# Patient Record
Sex: MALE | Race: WHITE | Employment: OTHER | URBAN - METROPOLITAN AREA
[De-identification: names, ages, dates, MRNs, and addresses within clinical notes are randomized per-mention and may not be internally consistent; named-entity substitution may affect disease eponyms.]

---

## 2019-04-24 ENCOUNTER — APPOINTMENT (OUTPATIENT)
Dept: GENERAL RADIOLOGY | Age: 69
DRG: 378 | End: 2019-04-24
Attending: EMERGENCY MEDICINE
Payer: MEDICARE

## 2019-04-24 ENCOUNTER — HOSPITAL ENCOUNTER (INPATIENT)
Age: 69
LOS: 2 days | Discharge: HOME | DRG: 378 | End: 2019-04-26
Attending: EMERGENCY MEDICINE | Admitting: INTERNAL MEDICINE
Payer: MEDICARE

## 2019-04-24 DIAGNOSIS — R55 SYNCOPE, UNSPECIFIED SYNCOPE TYPE: ICD-10-CM

## 2019-04-24 DIAGNOSIS — K92.2 GASTROINTESTINAL HEMORRHAGE, UNSPECIFIED GASTROINTESTINAL HEMORRHAGE TYPE: Primary | ICD-10-CM

## 2019-04-24 PROBLEM — E78.5 HYPERLIPIDEMIA: Chronic | Status: ACTIVE | Noted: 2019-04-24

## 2019-04-24 PROBLEM — E87.6 HYPOKALEMIA: Status: ACTIVE | Noted: 2019-04-24

## 2019-04-24 PROBLEM — E11.9 DIABETES MELLITUS TYPE 2, CONTROLLED (HCC): Chronic | Status: ACTIVE | Noted: 2019-04-24

## 2019-04-24 PROBLEM — D64.9 ANEMIA: Status: ACTIVE | Noted: 2019-04-24

## 2019-04-24 PROBLEM — N17.9 AKI (ACUTE KIDNEY INJURY) (HCC): Status: ACTIVE | Noted: 2019-04-24

## 2019-04-24 LAB
ABO + RH BLD: NORMAL
ALBUMIN SERPL-MCNC: 3 G/DL (ref 3.2–4.6)
ALBUMIN/GLOB SERPL: 1.1 {RATIO} (ref 1.2–3.5)
ALP SERPL-CCNC: 111 U/L (ref 50–136)
ALT SERPL-CCNC: 29 U/L (ref 12–65)
ANION GAP SERPL CALC-SCNC: 9 MMOL/L (ref 7–16)
AST SERPL-CCNC: 15 U/L (ref 15–37)
ATRIAL RATE: 74 BPM
BASOPHILS # BLD: 0.1 K/UL (ref 0–0.2)
BASOPHILS NFR BLD: 1 % (ref 0–2)
BILIRUB SERPL-MCNC: 0.6 MG/DL (ref 0.2–1.1)
BLOOD GROUP ANTIBODIES SERPL: NORMAL
BUN SERPL-MCNC: 25 MG/DL (ref 8–23)
CALCIUM SERPL-MCNC: 7.8 MG/DL (ref 8.3–10.4)
CALCULATED P AXIS, ECG09: 61 DEGREES
CALCULATED R AXIS, ECG10: 76 DEGREES
CALCULATED T AXIS, ECG11: 50 DEGREES
CHLORIDE SERPL-SCNC: 108 MMOL/L (ref 98–107)
CO2 SERPL-SCNC: 22 MMOL/L (ref 21–32)
CREAT SERPL-MCNC: 1.63 MG/DL (ref 0.8–1.5)
DIAGNOSIS, 93000: NORMAL
DIFFERENTIAL METHOD BLD: ABNORMAL
EOSINOPHIL # BLD: 0.1 K/UL (ref 0–0.8)
EOSINOPHIL NFR BLD: 1 % (ref 0.5–7.8)
ERYTHROCYTE [DISTWIDTH] IN BLOOD BY AUTOMATED COUNT: 12.8 % (ref 11.9–14.6)
GLOBULIN SER CALC-MCNC: 2.7 G/DL (ref 2.3–3.5)
GLUCOSE BLD STRIP.AUTO-MCNC: 236 MG/DL (ref 65–100)
GLUCOSE SERPL-MCNC: 216 MG/DL (ref 65–100)
HCT VFR BLD AUTO: 35.1 % (ref 41.1–50.3)
HCT VFR BLD AUTO: 36.6 % (ref 41.1–50.3)
HGB BLD-MCNC: 11.6 G/DL (ref 13.6–17.2)
HGB BLD-MCNC: 11.6 G/DL (ref 13.6–17.2)
IMM GRANULOCYTES # BLD AUTO: 0.1 K/UL (ref 0–0.5)
IMM GRANULOCYTES NFR BLD AUTO: 1 % (ref 0–5)
LACTATE BLD-SCNC: 2.93 MMOL/L (ref 0.5–1.9)
LACTATE SERPL-SCNC: 1.6 MMOL/L (ref 0.4–2)
LYMPHOCYTES # BLD: 3.3 K/UL (ref 0.5–4.6)
LYMPHOCYTES NFR BLD: 24 % (ref 13–44)
MAGNESIUM SERPL-MCNC: 1.7 MG/DL (ref 1.8–2.4)
MCH RBC QN AUTO: 29.5 PG (ref 26.1–32.9)
MCHC RBC AUTO-ENTMCNC: 33 G/DL (ref 31.4–35)
MCV RBC AUTO: 89.3 FL (ref 79.6–97.8)
MONOCYTES # BLD: 1.7 K/UL (ref 0.1–1.3)
MONOCYTES NFR BLD: 12 % (ref 4–12)
NEUTS SEG # BLD: 8.5 K/UL (ref 1.7–8.2)
NEUTS SEG NFR BLD: 62 % (ref 43–78)
NRBC # BLD: 0 K/UL (ref 0–0.2)
P-R INTERVAL, ECG05: 182 MS
PLATELET # BLD AUTO: 155 K/UL (ref 150–450)
PMV BLD AUTO: 11.9 FL (ref 9.4–12.3)
POTASSIUM SERPL-SCNC: 3.4 MMOL/L (ref 3.5–5.1)
PROT SERPL-MCNC: 5.7 G/DL (ref 6.3–8.2)
Q-T INTERVAL, ECG07: 370 MS
QRS DURATION, ECG06: 90 MS
QTC CALCULATION (BEZET), ECG08: 410 MS
RBC # BLD AUTO: 3.93 M/UL (ref 4.23–5.6)
SODIUM SERPL-SCNC: 139 MMOL/L (ref 136–145)
SPECIMEN EXP DATE BLD: NORMAL
VENTRICULAR RATE, ECG03: 74 BPM
WBC # BLD AUTO: 13.8 K/UL (ref 4.3–11.1)

## 2019-04-24 PROCEDURE — 93005 ELECTROCARDIOGRAM TRACING: CPT | Performed by: EMERGENCY MEDICINE

## 2019-04-24 PROCEDURE — 83735 ASSAY OF MAGNESIUM: CPT

## 2019-04-24 PROCEDURE — 99285 EMERGENCY DEPT VISIT HI MDM: CPT | Performed by: EMERGENCY MEDICINE

## 2019-04-24 PROCEDURE — 96375 TX/PRO/DX INJ NEW DRUG ADDON: CPT | Performed by: EMERGENCY MEDICINE

## 2019-04-24 PROCEDURE — 65660000000 HC RM CCU STEPDOWN

## 2019-04-24 PROCEDURE — 80053 COMPREHEN METABOLIC PANEL: CPT

## 2019-04-24 PROCEDURE — 74011250637 HC RX REV CODE- 250/637: Performed by: INTERNAL MEDICINE

## 2019-04-24 PROCEDURE — 74011250636 HC RX REV CODE- 250/636: Performed by: INTERNAL MEDICINE

## 2019-04-24 PROCEDURE — 74011636637 HC RX REV CODE- 636/637: Performed by: INTERNAL MEDICINE

## 2019-04-24 PROCEDURE — 85018 HEMOGLOBIN: CPT

## 2019-04-24 PROCEDURE — 81003 URINALYSIS AUTO W/O SCOPE: CPT

## 2019-04-24 PROCEDURE — 82962 GLUCOSE BLOOD TEST: CPT

## 2019-04-24 PROCEDURE — 85025 COMPLETE CBC W/AUTO DIFF WBC: CPT

## 2019-04-24 PROCEDURE — 83605 ASSAY OF LACTIC ACID: CPT

## 2019-04-24 PROCEDURE — C9113 INJ PANTOPRAZOLE SODIUM, VIA: HCPCS | Performed by: EMERGENCY MEDICINE

## 2019-04-24 PROCEDURE — 74011250636 HC RX REV CODE- 250/636: Performed by: EMERGENCY MEDICINE

## 2019-04-24 PROCEDURE — 86900 BLOOD TYPING SEROLOGIC ABO: CPT

## 2019-04-24 PROCEDURE — 36415 COLL VENOUS BLD VENIPUNCTURE: CPT

## 2019-04-24 PROCEDURE — 96374 THER/PROPH/DIAG INJ IV PUSH: CPT | Performed by: EMERGENCY MEDICINE

## 2019-04-24 PROCEDURE — 74011000250 HC RX REV CODE- 250: Performed by: EMERGENCY MEDICINE

## 2019-04-24 PROCEDURE — 77030020263 HC SOL INJ SOD CL0.9% LFCR 1000ML

## 2019-04-24 PROCEDURE — 71045 X-RAY EXAM CHEST 1 VIEW: CPT

## 2019-04-24 RX ORDER — PRAVASTATIN SODIUM 20 MG/1
20 TABLET ORAL DAILY
COMMUNITY
End: 2019-04-26

## 2019-04-24 RX ORDER — METFORMIN HYDROCHLORIDE 500 MG/1
500 TABLET ORAL AS NEEDED
Status: ON HOLD | COMMUNITY
End: 2019-04-26 | Stop reason: SDUPTHER

## 2019-04-24 RX ORDER — ONDANSETRON 2 MG/ML
4 INJECTION INTRAMUSCULAR; INTRAVENOUS
Status: COMPLETED | OUTPATIENT
Start: 2019-04-24 | End: 2019-04-24

## 2019-04-24 RX ORDER — ASPIRIN 81 MG/1
81 TABLET ORAL DAILY
COMMUNITY
End: 2019-04-26

## 2019-04-24 RX ORDER — POLYETHYLENE GLYCOL 3350 17 G/17G
238 POWDER, FOR SOLUTION ORAL ONCE
Status: COMPLETED | OUTPATIENT
Start: 2019-04-24 | End: 2019-04-24

## 2019-04-24 RX ORDER — INSULIN LISPRO 100 [IU]/ML
INJECTION, SOLUTION INTRAVENOUS; SUBCUTANEOUS EVERY 6 HOURS
Status: DISCONTINUED | OUTPATIENT
Start: 2019-04-24 | End: 2019-04-25

## 2019-04-24 RX ORDER — ACETAMINOPHEN 325 MG/1
650 TABLET ORAL
Status: DISCONTINUED | OUTPATIENT
Start: 2019-04-24 | End: 2019-04-26 | Stop reason: HOSPADM

## 2019-04-24 RX ORDER — LISINOPRIL 10 MG/1
10 TABLET ORAL DAILY
COMMUNITY
End: 2019-04-26

## 2019-04-24 RX ORDER — SODIUM CHLORIDE 0.9 % (FLUSH) 0.9 %
5-40 SYRINGE (ML) INJECTION EVERY 8 HOURS
Status: DISCONTINUED | OUTPATIENT
Start: 2019-04-24 | End: 2019-04-26 | Stop reason: HOSPADM

## 2019-04-24 RX ORDER — BISACODYL 5 MG
20 TABLET, DELAYED RELEASE (ENTERIC COATED) ORAL
Status: COMPLETED | OUTPATIENT
Start: 2019-04-24 | End: 2019-04-24

## 2019-04-24 RX ORDER — LEVOTHYROXINE SODIUM 150 UG/1
150 TABLET ORAL
COMMUNITY

## 2019-04-24 RX ORDER — POTASSIUM CHLORIDE 20 MEQ/1
20 TABLET, EXTENDED RELEASE ORAL
Status: COMPLETED | OUTPATIENT
Start: 2019-04-24 | End: 2019-04-24

## 2019-04-24 RX ORDER — SODIUM CHLORIDE 0.9 % (FLUSH) 0.9 %
5-40 SYRINGE (ML) INJECTION AS NEEDED
Status: DISCONTINUED | OUTPATIENT
Start: 2019-04-24 | End: 2019-04-26 | Stop reason: HOSPADM

## 2019-04-24 RX ORDER — SODIUM CHLORIDE 9 MG/ML
100 INJECTION, SOLUTION INTRAVENOUS CONTINUOUS
Status: DISCONTINUED | OUTPATIENT
Start: 2019-04-24 | End: 2019-04-25

## 2019-04-24 RX ORDER — ONDANSETRON 2 MG/ML
4 INJECTION INTRAMUSCULAR; INTRAVENOUS
Status: DISCONTINUED | OUTPATIENT
Start: 2019-04-24 | End: 2019-04-26 | Stop reason: HOSPADM

## 2019-04-24 RX ADMIN — Medication 10 ML: at 18:27

## 2019-04-24 RX ADMIN — SODIUM CHLORIDE 1000 ML: 900 INJECTION, SOLUTION INTRAVENOUS at 15:35

## 2019-04-24 RX ADMIN — ONDANSETRON 4 MG: 2 INJECTION INTRAMUSCULAR; INTRAVENOUS at 15:35

## 2019-04-24 RX ADMIN — POTASSIUM CHLORIDE 20 MEQ: 20 TABLET, EXTENDED RELEASE ORAL at 18:26

## 2019-04-24 RX ADMIN — POLYETHYLENE GLYCOL 3350 238 G: 17 POWDER, FOR SOLUTION ORAL at 20:49

## 2019-04-24 RX ADMIN — SODIUM CHLORIDE 100 ML/HR: 900 INJECTION, SOLUTION INTRAVENOUS at 18:00

## 2019-04-24 RX ADMIN — BISACODYL 20 MG: 5 TABLET, COATED ORAL at 18:45

## 2019-04-24 RX ADMIN — PANTOPRAZOLE SODIUM 40 MG: 40 INJECTION, POWDER, FOR SOLUTION INTRAVENOUS at 15:35

## 2019-04-24 RX ADMIN — Medication 10 ML: at 20:50

## 2019-04-24 NOTE — H&P
Hospitalist H&P Note Admit Date:  2019  3:05 PM  
Name:  Dorian Joel Age:  78 y.o. 
:  1950 MRN:  [id number] PCP:  Jorge Luis Orozco MD 
Treatment Team: Attending Provider: Rubi Tavarez MD; Primary Nurse: Joycelyn Cote RN 
 
HPI:  
 
CC:  Rectal bleeding Mr. Jimbo Magaña is a 79 yo male with PMH of controlled DM2, HLP, intentional weight loss done by intermittent fasting who us evaluated with several bloody BM occurring today. 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 No shiloh nausea , abd pain or fever. He has not eaten anything out of the ordinary. He takes asa 81 mg daily, no ETOH use. Last bleeding episode 2 hours ago. He was noted to have episode of passing out while in the bathroom per wife- not preceded by dizziness or chest pain though still feels somewhat unsteady. Denies prior renal issues-creatinine currently 1.63, HGB 11.6. Up to date with colonoscopy 2-3 years ago. 10 systems reviewed and negative except as noted in HPI. Past Medical History:  
Diagnosis Date  DM (diabetes mellitus) ([facility] [address]  GERD (gastroesophageal reflux disease)  Hyperlipemia  Hypertension History reviewed. No pertinent surgical history. No Known Allergies Social History Tobacco Use  Smoking status: Not on file  Smokeless tobacco: Never Used Substance Use Topics  Alcohol use: Never Frequency: Never  
  
family history. - DM2 There is no immunization history on file for this patient. PTA Medications: 
None Objective:  
 
Patient Vitals for the past 24 hrs: 
 Pulse Resp BP SpO2  
19 1601 76 23 112/69 99 % 19 1551 73 22 112/63 100 % 19 1530 71 10 100/64 100 % 19 1513 76 18 122/75 100 % Oxygen Therapy O2 Sat (%): 99 % (19 1601) Pulse via Oximetry: 76 beats per minute (19 1601) O2 Device: Nasal cannula (04/24/19 1513) O2 Flow Rate (L/min): 2 l/min (04/24/19 1513) No intake or output data in the 24 hours ending 04/24/19 1659 Physical Exam: 
General:    Alert. No distress Eyes:   Normal sclera. Extraocular movements intact. PERRLA 
ENT:  Normocephalic, atraumatic. Dry mucous membranes CV:   RRR. No m/r/g.  . No edema Lungs:  CTAB. No wheezing, rhonchi, or rales. Abdomen: Soft, nontender, nondistended. Decreased BS Extremities: Warm and dry. . 
Neurologic:  grossly intact. Skin:     No rashes or jaundice. Normal coloration Psych:  Normal mood and affect. I reviewed the labs, imaging, EKGs, telemetry, and other studies done this admission. EKG tracing personally reviewed as NSR Data Review:  
Recent Results (from the past 24 hour(s)) EKG, 12 LEAD, INITIAL Collection Time: 04/24/19  3:11 PM  
Result Value Ref Range Ventricular Rate 74 BPM  
 Atrial Rate 74 BPM  
 P-R Interval 182 ms QRS Duration 90 ms Q-T Interval 370 ms QTC Calculation (Bezet) 410 ms Calculated P Axis 61 degrees Calculated R Axis 76 degrees Calculated T Axis 50 degrees Diagnosis    
  !! AGE AND GENDER SPECIFIC ECG ANALYSIS !! Normal sinus rhythm Normal ECG When compared with ECG of 14-NOV-2007 15:15, No significant change was found TYPE & SCREEN Collection Time: 04/24/19  3:15 PM  
Result Value Ref Range Crossmatch Expiration 04/27/2019 ABO/Rh(D) O POSITIVE Antibody screen NEG   
CBC WITH AUTOMATED DIFF Collection Time: 04/24/19  3:17 PM  
Result Value Ref Range WBC 13.8 (H) 4.3 - 11.1 K/uL  
 RBC 3.93 (L) 4.23 - 5.6 M/uL  
 HGB 11.6 (L) 13.6 - 17.2 g/dL HCT 35.1 (L) 41.1 - 50.3 % MCV 89.3 79.6 - 97.8 FL  
 MCH 29.5 26.1 - 32.9 PG  
 MCHC 33.0 31.4 - 35.0 g/dL  
 RDW 12.8 11.9 - 14.6 % PLATELET 851 054 - 691 K/uL MPV 11.9 9.4 - 12.3 FL ABSOLUTE NRBC 0.00 0.0 - 0.2 K/uL  
 DF AUTOMATED NEUTROPHILS 62 43 - 78 % LYMPHOCYTES 24 13 - 44 % MONOCYTES 12 4.0 - 12.0 % EOSINOPHILS 1 0.5 - 7.8 % BASOPHILS 1 0.0 - 2.0 % IMMATURE GRANULOCYTES 1 0.0 - 5.0 %  
 ABS. NEUTROPHILS 8.5 (H) 1.7 - 8.2 K/UL  
 ABS. LYMPHOCYTES 3.3 0.5 - 4.6 K/UL  
 ABS. MONOCYTES 1.7 (H) 0.1 - 1.3 K/UL  
 ABS. EOSINOPHILS 0.1 0.0 - 0.8 K/UL  
 ABS. BASOPHILS 0.1 0.0 - 0.2 K/UL  
 ABS. IMM. GRANS. 0.1 0.0 - 0.5 K/UL METABOLIC PANEL, COMPREHENSIVE Collection Time: 04/24/19  3:17 PM  
Result Value Ref Range Sodium 139 136 - 145 mmol/L Potassium 3.4 (L) 3.5 - 5.1 mmol/L Chloride 108 (H) 98 - 107 mmol/L  
 CO2 22 21 - 32 mmol/L Anion gap 9 7 - 16 mmol/L Glucose 216 (H) 65 - 100 mg/dL BUN 25 (H) 8 - 23 MG/DL Creatinine 1.63 (H) 0.8 - 1.5 MG/DL  
 GFR est AA 54 (L) >60 ml/min/1.73m2 GFR est non-AA 45 (L) >60 ml/min/1.73m2 Calcium 7.8 (L) 8.3 - 10.4 MG/DL Bilirubin, total 0.6 0.2 - 1.1 MG/DL  
 ALT (SGPT) 29 12 - 65 U/L  
 AST (SGOT) 15 15 - 37 U/L Alk. phosphatase 111 50 - 136 U/L Protein, total 5.7 (L) 6.3 - 8.2 g/dL Albumin 3.0 (L) 3.2 - 4.6 g/dL Globulin 2.7 2.3 - 3.5 g/dL A-G Ratio 1.1 (L) 1.2 - 3.5 POC LACTIC ACID Collection Time: 04/24/19  3:18 PM  
Result Value Ref Range Lactic Acid (POC) 2.93 (H) 0.5 - 1.9 mmol/L All Micro Results None Other Studies: Xr Chest [facility] Result Date: 4/24/2019 1 View portable chest x-ray 4/24/2019 3:36 PM Indication: Weakness, nausea, syncope. Comparison: None available at this PACS system Findings: This portable upright AP chest at 1539 shows the cardiomediastinal contours to be within normal limits. Cardiac monitoring leads project over the thorax. The lungs are clear and normally inflated. There is normal pulmonary vascularity. There is no pleural abnormality. IMPRESSION: Negative chest, no acute findings. Assessment and Plan:  
 
Hospital Problems as of 4/24/2019 Never Reviewed Codes Class Noted - Resolved POA * (Principal) GI bleed ICD-10-CM: K92.2 ICD-9-CM: 578.9  4/24/2019 - Present Yes Syncope ICD-10-CM: R55 
ICD-9-CM: 780.2  4/24/2019 - Present Yes Hyperlipidemia (Chronic) ICD-10-CM: V43.7 ICD-9-CM: 272.4  4/24/2019 - Present Yes Diabetes mellitus type 2, controlled ([facility] (Chronic) ICD-10-CM: E11.9 ICD-9-CM: 250.00  4/24/2019 - Present Yes SC[facility]acute [facility]) ([facility] ICD-10-CM: N17.9 ICD-9-CM: 584.9  4/24/2019 - Present Yes Anemia ICD-10-CM: D64.9 ICD-9-CM: 285.9  4/24/2019 - Present Yes Hypokalemia ICD-10-CM: E87.6 ICD-9-CM: 276.8  4/24/2019 - Present Yes · Acute GI bleed with mild acute blood loss anemia: admit to remote tele, NPO until seen by GI, trend HGB every 8 hours, transfuse HGB <8.0, add IV protonix once daily, stop ASA · Leukocytosis: likely reactive, repeat CBC with UA  
· Lactic acidosis: due to GI bleed, add IVF and recheck lab trend · SCOTT: hydrate and repeat lab, check UA  
· Hypokalemia: supplement and recheck, check magnesium · DM2: SSI , hold metformin · Syncope : followup on tele, appears vasovagal episode, 
· HLP: resume statin upon discharge Discharge planning:  pending DVT ppx: SCD Code status:  Full Estimated LOS:  Greater than 2 midnights Risk:  high Care plan: wife Carline Fulton [phone] Signed: Kristi Miner MD

## 2019-04-24 NOTE — H&P (VIEW-ONLY)
[facility] Consult Note Referring Physician:  Dr Valentine Barnes Consult Date:  4/24/2019 Admit Date:  4/24/2019 Chief Complaint:  Hematochezia Subjective:  
 
History of Present Illness:  Patient is a 78 y.o. WM with DM (pt of Dr Delmi Cooper) who is seen in consultation at the request of Dr. Valentine Barnes for hematochezia. The pt didn't sleep well last pm and felt a little queasy (thought maybe he was getting sick). He woke up this am fatigued but overall fine. He had a normal brown BM. Later in the day, he started having multiple loose, bloody BMs filling the commode (5-6 episodes) with urgency. He had a syncopal episode and wasn't fully responsive when his wife found him so EMS was called. He was initially hypotensive but BP responded to IVFs. He is feeling better this evening with no further bleeding or BMs but still fatigued. Hgb in ER was 11.6. Last colo was 2010 for which had sigmoid/descending diverticulosis and IH (rec'd repeat in 10yrs). Hasn't had food in 24hrs. PMH: 
Past Medical History:  
Diagnosis Date  DM (diabetes mellitus) ([address]  GERD (gastroesophageal reflux disease)  Hyperlipemia  Hypertension PSH: 
History reviewed. No pertinent surgical history. Allergies: 
No Known Allergies Home Medications: 
Prior to Admission medications Not on File Hospital Medications: 
Current Facility-Administered Medications Medication Dose Route Frequency  sodium chloride (NS) flush 5-40 mL  5-40 mL IntraVENous Q8H  
 sodium chloride (NS) flush 5-40 mL  5-40 mL IntraVENous PRN  
 acetaminophen (TYLENOL) tablet 650 mg  650 mg Oral Q6H PRN  
 ondansetron (ZOFRAN) injection 4 mg  4 mg IntraVENous Q4H PRN  
 insulin lispro (HUMALOG) injection   SubCUTAneous Q6H  
 0.9% sodium chloride infusion  100 mL/hr IntraVENous CONTINUOUS  
 [START ON 4/25/2019] pantoprazole (PROTONIX) 40 mg in sodium chloride 0.9% 10 mL injection  40 mg IntraVENous DAILY  bisacodyl (DULCOLAX) tablet 20 mg  20 mg Oral NOW  polyethylene glycol (MIRALAX) powder 238 g  238 g Oral ONCE Social History: 
Social History Tobacco Use  Smoking status: Not on file  Smokeless tobacco: Never Used Substance Use Topics  Alcohol use: Never Frequency: Never Family History: 
History reviewed. No pertinent family history. Review of Systems: A detailed 10 system ROS is obtained, with pertinent positives as listed above. All others are negative. Diet:  NPO Objective:  
 
Physical Exam: 
Vitals: 
Visit Vitals /73 (BP 1 Location: Left arm, BP Patient Position: At rest) Pulse 77 Temp 98.2 °F (36.8 °C) Resp 20 Ht 5' 8\" (1.727 m) Wt 81.6 kg (180 lb) SpO2 98% BMI 27.37 kg/m² Gen:  Pt is alert, cooperative, no acute distress Skin:  Extremities and face reveal no rashes. No jaundice HEENT: Sclerae anicteric. MMM. No abnormal pigmentation of the lips. The neck is supple. Cardiovascular: Regular rate and rhythm. No murmurs, gallops, or rubs. Respiratory:  Comfortable breathing with no accessory muscle use. Clear breath sounds anteriorly with no wheezes, rales, or rhonchi. GI:  Abdomen nondistended, soft, and nontender. Normal active bowel sounds. No enlargement of the liver or spleen. No masses palpable. Rectal:  Deferred Musculoskeletal:  No pitting edema of the lower legs. Neurological:  Gross memory appears intact. Patient is alert and oriented. Laboratory:   
Recent Labs  
  04/24/19 
1517 WBC 13.8* HGB 11.6* HCT 35.1*  
 MCV 89.3   
K 3.4*  
* CO2 22 BUN 25* CREA 1.63* CA 7.8*  
*  SGOT 15 ALT 29 TBILI 0.6 Assessment: 
  
 
Principal Problem: 
  GI bleed (4/24/2019) Active Problems: 
  Syncope (4/24/2019) Hyperlipidemia (4/24/2019) Diabetes mellitus type 2, controlled ([facility] [address] (4/24/2019) SCOTT (acute kidney injury) ([facility] [address] (4/24/2019) Anemia (4/24/2019) Hypokalemia (4/24/2019) Plan: 
  
 
Pleasant 66yo WM with DM and HLD presenting with 5-6 episodes of large volume painless hematochezia associated with mild blood loss anemia and hypotension (now improved), concerning for lower GI bleed. Given his presentation and hx, the most likely etiology is a diverticular bleed (though other etiologies such as neoplasm, ischemia, etc need to be considered- less likely given the presentation and lack of abdominal pain---- though he was hypotensive so ischemia still possible). - Start clears 
- NPO after midnight - Prep tonight for colo tomorrow - Trend H/H and transfuse as needed (likely will be lower at next check) - If he starts acutely bleeding tonight with VS instability, will need to go for STAT bleeding scan with possible angio/embolization per MARCELINA Nur MD

## 2019-04-24 NOTE — ED PROVIDER NOTES
Here with 3 bloody stools. The last one being quite large and symptomatically hypotensive after that. He returns close to baseline after being given a 700 mL bolus of saline during transport by EMS after thought pulseless by spouse with syncope. He does not have a history of GI bleeds in the past.  He is not on any medications of increased risk other and a baby aspirin per day. No history of bleeding ulcer. Denies any present abdominal pain. No other abnormal bleeding or bruising. Primary other health issues and is type 2 diabetes. Denies any chest pain or cardiac history of awareness. Noticed no bleeding otherwise. The history is provided by the patient. Rectal Bleeding This is a new problem. Pertinent negatives include no abdominal pain, no chills and no fever. He has tried nothing for the symptoms. His past medical history does not include irritable bowel syndrome, small bowel obstruction or abdominal surgery. Past Medical History:  
Diagnosis Date  DM (diabetes mellitus) ([facility] [address]  GERD (gastroesophageal reflux disease)  Hyperlipemia  Hypertension History reviewed. No pertinent surgical history. History reviewed. No pertinent family history. Social History Socioeconomic History  Marital status:  Spouse name: Not on file  Number of children: Not on file  Years of education: Not on file  Highest education level: Not on file Occupational History  Not on file Social Needs  Financial resource strain: Not on file  Food insecurity:  
  Worry: Not on file Inability: Not on file  Transportation needs:  
  Medical: Not on file Non-medical: Not on file Tobacco Use  Smoking status: Not on file  Smokeless tobacco: Never Used Substance and Sexual Activity  Alcohol use: Never Frequency: Never  Drug use: Never  Sexual activity: Not on file Lifestyle  Physical activity: Days per week: Not on file Minutes per session: Not on file  Stress: Not on file Relationships  Social connections:  
  Talks on phone: Not on file Gets together: Not on file Attends [employer] service: Not on file Active member of club or organization: Not on file Attends meetings of clubs or organizations: Not on file Relationship status: Not on file  Intimate partner violence:  
  Fear of current or ex partner: Not on file Emotionally abused: Not on file Physically abused: Not on file Forced sexual activity: Not on file Other Topics Concern  Not on file Social History Narrative  Not on file ALLERGIES: Patient has no known allergies. Review of Systems Constitutional: Negative for chills and fever. HENT: Negative for nosebleeds. Gastrointestinal: Positive for anal bleeding. Negative for abdominal pain. Genitourinary: Negative for hematuria. Hematological: Does not bruise/bleed easily. Psychiatric/Behavioral: Negative for confusion and decreased concentration. All other systems reviewed and are negative. Vitals:  
 04/24/19 1513 04/24/19 1530 04/24/19 1551 04/24/19 1601 BP: 122/75 100/64 112/63 112/69 Pulse: 76 71 73 76 Resp: 18 10 22 23 SpO2: 100% 100% 100% 99% Weight: 81.6 kg (180 lb) Height: 5' 8\" (1.727 m) Physical Exam  
Constitutional: He appears well-developed and well-nourished. No distress. Stabilizes in ER. Very alert and conversant HENT:  
Head: Atraumatic. Eyes: No scleral icterus. Neck: Neck supple. Cardiovascular: Normal rate. Pulmonary/Chest: Effort normal. No respiratory distress. Abdominal: Soft. There is no tenderness. There is no guarding. Musculoskeletal: He exhibits no edema or deformity. Neurological: He is alert. Skin: Skin is warm and dry. No bruising and no ecchymosis noted. Psychiatric: Thought content normal.  
Nursing note and vitals reviewed. MDM 
Number of Diagnoses or Management Options Diagnosis management comments: Symptomatic GIB at home - GI will see. May need blood if continues Amount and/or Complexity of Data Reviewed Clinical lab tests: reviewed and ordered Risk of Complications, Morbidity, and/or Mortality Presenting problems: moderate Diagnostic procedures: low Management options: moderate Patient Progress Patient progress: stable Procedures

## 2019-04-24 NOTE — CONSULTS
[facility] Consult Note    Referring Physician:  Dr Bernie John    Consult Date:  4/24/2019    Admit Date:  4/24/2019    Chief Complaint:  Hematochezia    Subjective:     History of Present Illness:  Patient is a 78 y.o. WM with DM (pt of Dr Arlene Cast) who is seen in consultation at the request of Dr. Bernie John for hematochezia. The pt didn't sleep well last pm and felt a little queasy (thought maybe he was getting sick). He woke up this am fatigued but overall fine. He had a normal brown BM. Later in the day, he started having multiple loose, bloody BMs filling the commode (5-6 episodes) with urgency. He had a syncopal episode and wasn't fully responsive when his wife found him so EMS was called. He was initially hypotensive but BP responded to IVFs. He is feeling better this evening with no further bleeding or BMs but still fatigued. Hgb in ER was 11.6. Last colo was 2010 for which had sigmoid/descending diverticulosis and IH (rec'd repeat in 10yrs). Hasn't had food in 24hrs. PMH:  Past Medical History:   Diagnosis Date    DM (diabetes mellitus) ([address]     GERD (gastroesophageal reflux disease)     Hyperlipemia     Hypertension        PSH:  History reviewed. No pertinent surgical history.     Allergies:  No Known Allergies    Home Medications:  Prior to Admission medications    Not on [facility] Medications:  Current Facility-Administered Medications   Medication Dose Route Frequency    sodium chloride (NS) flush 5-40 mL  5-40 mL IntraVENous Q8H    sodium chloride (NS) flush 5-40 mL  5-40 mL IntraVENous PRN    acetaminophen (TYLENOL) tablet 650 mg  650 mg Oral Q6H PRN    ondansetron (ZOFRAN) injection 4 mg  4 mg IntraVENous Q4H PRN    insulin lispro (HUMALOG) injection   SubCUTAneous Q6H    0.9% sodium chloride infusion  100 mL/hr IntraVENous CONTINUOUS    [START ON 4/25/2019] pantoprazole (PROTONIX) 40 mg in sodium chloride 0.9% 10 mL injection  40 mg IntraVENous DAILY    bisacodyl (DULCOLAX) tablet 20 mg  20 mg Oral NOW    polyethylene glycol (MIRALAX) powder 238 g  238 g Oral ONCE       Social History:  Social History     Tobacco Use    Smoking status: Not on file    Smokeless tobacco: Never Used   Substance Use Topics    Alcohol use: Never     Frequency: Never         Family History:  History reviewed. No pertinent family history. Review of Systems:  A detailed 10 system ROS is obtained, with pertinent positives as listed above. All others are negative. Diet:  NPO    Objective:     Physical Exam:  Vitals:  Visit Vitals  /73 (BP 1 Location: Left arm, BP Patient Position: At rest)   Pulse 77   Temp 98.2 °F (36.8 °C)   Resp 20   Ht 5' 8\" (1.727 m)   Wt 81.6 kg (180 lb)   SpO2 98%   BMI 27.37 kg/m²     Gen:  Pt is alert, cooperative, no acute distress  Skin:  Extremities and face reveal no rashes. No jaundice  HEENT: Sclerae anicteric. MMM. No abnormal pigmentation of the lips. The neck is supple. Cardiovascular: Regular rate and rhythm. No murmurs, gallops, or rubs. Respiratory:  Comfortable breathing with no accessory muscle use. Clear breath sounds anteriorly with no wheezes, rales, or rhonchi. GI:  Abdomen nondistended, soft, and nontender. Normal active bowel sounds. No enlargement of the liver or spleen. No masses palpable. Rectal:  Deferred  Musculoskeletal:  No pitting edema of the lower legs. Neurological:  Gross memory appears intact. Patient is alert and oriented.       Laboratory:    Recent Labs     04/24/19  1517   WBC 13.8*   HGB 11.6*   HCT 35.1*      MCV 89.3      K 3.4*   *   CO2 22   BUN 25*   CREA 1.63*   CA 7.8*   *      SGOT 15   ALT 29   TBILI 0.6       Assessment:       Principal Problem:    GI bleed (4/24/2019)    Active Problems:    Syncope (4/24/2019)      Hyperlipidemia (4/24/2019)      Diabetes mellitus type 2, controlled ([facility] [address] (4/24/2019)      SC[facility] (4/24/2019)      Anemia (4/24/2019)      Hypokalemia (4/24/2019)        Plan:       Pleasant 66yo WM with DM and HLD presenting with 5-6 episodes of large volume painless hematochezia associated with mild blood loss anemia and hypotension (now improved), concerning for lower GI bleed. Given his presentation and hx, the most likely etiology is a diverticular bleed (though other etiologies such as neoplasm, ischemia, etc need to be considered- less likely given the presentation and lack of abdominal pain---- though he was hypotensive so ischemia still possible).     - Start clears  - NPO after midnight  - Prep tonight for colo tomorrow  - Trend H/H and transfuse as needed (likely will be lower at next check)  - If he starts acutely bleeding tonight with VS instability, will need to go for STAT bleeding scan with possible angio/embolization per IR P Mauri Cowden, MD

## 2019-04-24 NOTE — PROGRESS NOTES
END OF SHIFT NOTE: 
 
Intake/Output No intake/output data recorded. Voiding: YES Catheter: NO 
Drain:   
 
 
 
 
Stool:  0 occurrences. Stool Assessment Stool Color: Red (04/24/19 1517) Stool Appearance: Melena (04/24/19 1517) Stool Amount: Large (04/24/19 1517) Stool Source/Status: Rectum (04/24/19 1517) Emesis:  0 occurrences. VITAL SIGNS Patient Vitals for the past 12 hrs: 
 Temp Pulse Resp BP SpO2  
04/24/19 1800 98.2 °F (36.8 °C) 77 20 106/73 98 % 04/24/19 1730 98.5 °F (36.9 °C)      
04/24/19 1701  68 24 107/64 100 % 04/24/19 1601  76 23 112/69 99 % 04/24/19 1551  73 22 112/63 100 % 04/24/19 1530  71 10 100/64 100 % 04/24/19 1513  76 18 122/75 100 % Pain Assessment Pain 1 Pain Scale 1: Numeric (0 - 10) (04/24/19 1513) Pain Intensity 1: 2 (04/24/19 1513) Ambulating Yes Additional Information:  
 
Shift report given to oncoming nurse at the bedside. Leopold Nails

## 2019-04-24 NOTE — ED TRIAGE NOTES
Pt arrives per EMS from home after several rounds of bright red bloody stool. Pt found on toilet by EMS. Upon moving pt to stretcher pt went unresponsive and they lost radial pulses. . Given 600mls fluid en route. No hx of GI bleeds. On arrival pt pale, cool, on non rebreather and complaining of feeling weak and nauseated. Per family pt has been doing intermittent fasting to control his diabetes.

## 2019-04-24 NOTE — ED NOTES
TRANSFER - OUT REPORT: 
 
Verbal report given to JUVE Dow(name) on Mac Fermin  being transferred to 5th floor(unit) for routine progression of care Report consisted of patients Situation, Background, Assessment and  
Recommendations(SBAR). Information from the following report(s) ED Summary was reviewed with the receiving nurse. Lines:  
Peripheral IV 04/24/19 Left Antecubital (Active) Site Assessment Clean, dry, & intact 4/24/2019  3:10 PM  
Phlebitis Assessment 0 4/24/2019  3:10 PM  
Infiltration Assessment 0 4/24/2019  3:10 PM  
Dressing Status Clean, dry, & intact 4/24/2019  3:10 PM  
   
Peripheral IV 04/24/19 Right Antecubital (Active) Site Assessment Clean, dry, & intact 4/24/2019  3:10 PM  
Phlebitis Assessment 0 4/24/2019  3:10 PM  
Infiltration Assessment 0 4/24/2019  3:10 PM  
Dressing Status Clean, dry, & intact 4/24/2019  3:10 PM  
  
 
Opportunity for questions and clarification was provided. Patient transported with: 
 O2 @ 2 liters, pt chart, pt belongings, IV fluids, family member

## 2019-04-24 NOTE — PROGRESS NOTES
TRANSFER - IN REPORT: 
Verbal report received from [facility]) on Mari Sandoval  being received from ED(unit) for routine progression of care Report consisted of patients Situation, Background, Assessment and  
Recommendations(SBAR). Information from the following report(s) SBAR, Kardex, Intake/Output, MAR and Recent Results was reviewed with the receiving nurse. Opportunity for questions and clarification was provided. Assessment will be completed upon patients arrival to unit and care assumed.

## 2019-04-25 ENCOUNTER — ANESTHESIA EVENT (OUTPATIENT)
Dept: ENDOSCOPY | Age: 69
DRG: 378 | End: 2019-04-25
Payer: MEDICARE

## 2019-04-25 ENCOUNTER — ANESTHESIA (OUTPATIENT)
Dept: ENDOSCOPY | Age: 69
DRG: 378 | End: 2019-04-25
Payer: MEDICARE

## 2019-04-25 LAB
ANION GAP SERPL CALC-SCNC: 10 MMOL/L (ref 7–16)
APPEARANCE UR: CLEAR
BASOPHILS # BLD: 0 K/UL (ref 0–0.2)
BASOPHILS NFR BLD: 0 % (ref 0–2)
BILIRUB UR QL: NEGATIVE
BUN SERPL-MCNC: 18 MG/DL (ref 8–23)
CALCIUM SERPL-MCNC: 7.9 MG/DL (ref 8.3–10.4)
CHLORIDE SERPL-SCNC: 108 MMOL/L (ref 98–107)
CO2 SERPL-SCNC: 21 MMOL/L (ref 21–32)
COLOR UR: YELLOW
CREAT SERPL-MCNC: 1.06 MG/DL (ref 0.8–1.5)
DIFFERENTIAL METHOD BLD: ABNORMAL
EOSINOPHIL # BLD: 0 K/UL (ref 0–0.8)
EOSINOPHIL NFR BLD: 0 % (ref 0.5–7.8)
ERYTHROCYTE [DISTWIDTH] IN BLOOD BY AUTOMATED COUNT: 12.9 % (ref 11.9–14.6)
GLUCOSE BLD STRIP.AUTO-MCNC: 135 MG/DL (ref 65–100)
GLUCOSE BLD STRIP.AUTO-MCNC: 138 MG/DL (ref 65–100)
GLUCOSE BLD STRIP.AUTO-MCNC: 140 MG/DL (ref 65–100)
GLUCOSE BLD STRIP.AUTO-MCNC: 207 MG/DL (ref 65–100)
GLUCOSE BLD STRIP.AUTO-MCNC: 265 MG/DL (ref 65–100)
GLUCOSE SERPL-MCNC: 217 MG/DL (ref 65–100)
GLUCOSE UR STRIP.AUTO-MCNC: NEGATIVE MG/DL
HCT VFR BLD AUTO: 32.6 % (ref 41.1–50.3)
HCT VFR BLD AUTO: 34 % (ref 41.1–50.3)
HGB BLD-MCNC: 10.4 G/DL (ref 13.6–17.2)
HGB BLD-MCNC: 10.8 G/DL (ref 13.6–17.2)
HGB UR QL STRIP: NEGATIVE
IMM GRANULOCYTES # BLD AUTO: 0 K/UL (ref 0–0.5)
IMM GRANULOCYTES NFR BLD AUTO: 1 % (ref 0–5)
KETONES UR QL STRIP.AUTO: NEGATIVE MG/DL
LACTATE SERPL-SCNC: 1.3 MMOL/L (ref 0.4–2)
LEUKOCYTE ESTERASE UR QL STRIP.AUTO: NEGATIVE
LYMPHOCYTES # BLD: 1.1 K/UL (ref 0.5–4.6)
LYMPHOCYTES NFR BLD: 14 % (ref 13–44)
MCH RBC QN AUTO: 28.8 PG (ref 26.1–32.9)
MCHC RBC AUTO-ENTMCNC: 31.9 G/DL (ref 31.4–35)
MCV RBC AUTO: 90.3 FL (ref 79.6–97.8)
MONOCYTES # BLD: 0.7 K/UL (ref 0.1–1.3)
MONOCYTES NFR BLD: 8 % (ref 4–12)
NEUTS SEG # BLD: 6 K/UL (ref 1.7–8.2)
NEUTS SEG NFR BLD: 76 % (ref 43–78)
NITRITE UR QL STRIP.AUTO: NEGATIVE
NRBC # BLD: 0 K/UL (ref 0–0.2)
PH UR STRIP: 5.5 [PH] (ref 5–9)
PLATELET # BLD AUTO: 107 K/UL (ref 150–450)
PMV BLD AUTO: 11.4 FL (ref 9.4–12.3)
POTASSIUM SERPL-SCNC: 3.9 MMOL/L (ref 3.5–5.1)
PROT UR STRIP-MCNC: NEGATIVE MG/DL
RBC # BLD AUTO: 3.61 M/UL (ref 4.23–5.6)
SODIUM SERPL-SCNC: 139 MMOL/L (ref 136–145)
SP GR UR REFRACTOMETRY: 1.01 (ref 1–1.02)
UROBILINOGEN UR QL STRIP.AUTO: 0.2 EU/DL (ref 0.2–1)
WBC # BLD AUTO: 7.9 K/UL (ref 4.3–11.1)

## 2019-04-25 PROCEDURE — 85018 HEMOGLOBIN: CPT

## 2019-04-25 PROCEDURE — 74011000250 HC RX REV CODE- 250: Performed by: INTERNAL MEDICINE

## 2019-04-25 PROCEDURE — 83605 ASSAY OF LACTIC ACID: CPT

## 2019-04-25 PROCEDURE — 80048 BASIC METABOLIC PNL TOTAL CA: CPT

## 2019-04-25 PROCEDURE — C8929 TTE W OR WO FOL WCON,DOPPLER: HCPCS

## 2019-04-25 PROCEDURE — 85025 COMPLETE CBC W/AUTO DIFF WBC: CPT

## 2019-04-25 PROCEDURE — 65660000000 HC RM CCU STEPDOWN

## 2019-04-25 PROCEDURE — 74011250637 HC RX REV CODE- 250/637: Performed by: INTERNAL MEDICINE

## 2019-04-25 PROCEDURE — 76040000025: Performed by: INTERNAL MEDICINE

## 2019-04-25 PROCEDURE — 74011250636 HC RX REV CODE- 250/636

## 2019-04-25 PROCEDURE — 77030020263 HC SOL INJ SOD CL0.9% LFCR 1000ML

## 2019-04-25 PROCEDURE — 76060000032 HC ANESTHESIA 0.5 TO 1 HR: Performed by: INTERNAL MEDICINE

## 2019-04-25 PROCEDURE — 94760 N-INVAS EAR/PLS OXIMETRY 1: CPT

## 2019-04-25 PROCEDURE — 74011636637 HC RX REV CODE- 636/637: Performed by: INTERNAL MEDICINE

## 2019-04-25 PROCEDURE — 82962 GLUCOSE BLOOD TEST: CPT

## 2019-04-25 PROCEDURE — 74011250636 HC RX REV CODE- 250/636: Performed by: INTERNAL MEDICINE

## 2019-04-25 PROCEDURE — 36415 COLL VENOUS BLD VENIPUNCTURE: CPT

## 2019-04-25 PROCEDURE — 0DJD8ZZ INSPECTION OF LOWER INTESTINAL TRACT, VIA NATURAL OR ARTIFICIAL OPENING ENDOSCOPIC: ICD-10-PCS | Performed by: INTERNAL MEDICINE

## 2019-04-25 PROCEDURE — C9113 INJ PANTOPRAZOLE SODIUM, VIA: HCPCS | Performed by: INTERNAL MEDICINE

## 2019-04-25 RX ORDER — PANTOPRAZOLE SODIUM 40 MG/1
40 TABLET, DELAYED RELEASE ORAL
Status: DISCONTINUED | OUTPATIENT
Start: 2019-04-25 | End: 2019-04-26 | Stop reason: HOSPADM

## 2019-04-25 RX ORDER — SODIUM CHLORIDE, SODIUM LACTATE, POTASSIUM CHLORIDE, CALCIUM CHLORIDE 600; 310; 30; 20 MG/100ML; MG/100ML; MG/100ML; MG/100ML
1000 INJECTION, SOLUTION INTRAVENOUS CONTINUOUS
Status: DISCONTINUED | OUTPATIENT
Start: 2019-04-25 | End: 2019-04-25 | Stop reason: HOSPADM

## 2019-04-25 RX ORDER — PROPOFOL 10 MG/ML
INJECTION, EMULSION INTRAVENOUS
Status: DISCONTINUED | OUTPATIENT
Start: 2019-04-25 | End: 2019-04-25 | Stop reason: HOSPADM

## 2019-04-25 RX ORDER — LIDOCAINE HYDROCHLORIDE 20 MG/ML
INJECTION, SOLUTION EPIDURAL; INFILTRATION; INTRACAUDAL; PERINEURAL AS NEEDED
Status: DISCONTINUED | OUTPATIENT
Start: 2019-04-25 | End: 2019-04-25 | Stop reason: HOSPADM

## 2019-04-25 RX ORDER — PROPOFOL 10 MG/ML
INJECTION, EMULSION INTRAVENOUS AS NEEDED
Status: DISCONTINUED | OUTPATIENT
Start: 2019-04-25 | End: 2019-04-25 | Stop reason: HOSPADM

## 2019-04-25 RX ADMIN — PROPOFOL 140 MCG/KG/MIN: 10 INJECTION, EMULSION INTRAVENOUS at 11:04

## 2019-04-25 RX ADMIN — Medication 10 ML: at 05:37

## 2019-04-25 RX ADMIN — INSULIN LISPRO 6 UNITS: 100 INJECTION, SOLUTION INTRAVENOUS; SUBCUTANEOUS at 00:03

## 2019-04-25 RX ADMIN — PERFLUTREN 1 ML: 6.52 INJECTION, SUSPENSION INTRAVENOUS at 15:21

## 2019-04-25 RX ADMIN — SODIUM CHLORIDE, SODIUM LACTATE, POTASSIUM CHLORIDE, AND CALCIUM CHLORIDE 1000 ML: 600; 310; 30; 20 INJECTION, SOLUTION INTRAVENOUS at 10:36

## 2019-04-25 RX ADMIN — Medication 10 ML: at 14:00

## 2019-04-25 RX ADMIN — PROPOFOL 60 MG: 10 INJECTION, EMULSION INTRAVENOUS at 11:03

## 2019-04-25 RX ADMIN — LIDOCAINE HYDROCHLORIDE 40 MG: 20 INJECTION, SOLUTION EPIDURAL; INFILTRATION; INTRACAUDAL; PERINEURAL at 11:02

## 2019-04-25 RX ADMIN — Medication 20 ML: at 22:00

## 2019-04-25 RX ADMIN — PANTOPRAZOLE SODIUM 40 MG: 40 INJECTION, POWDER, FOR SOLUTION INTRAVENOUS at 09:15

## 2019-04-25 RX ADMIN — SODIUM CHLORIDE 100 ML/HR: 900 INJECTION, SOLUTION INTRAVENOUS at 02:59

## 2019-04-25 RX ADMIN — PANTOPRAZOLE SODIUM 40 MG: 40 TABLET, DELAYED RELEASE ORAL at 15:31

## 2019-04-25 NOTE — PROGRESS NOTES
1208- pt arrived back on floor from procedure, transported via stretcher - assessment done, VSS. No pain. TRANSFER - IN REPORT: 
 
Verbal report received from Tavares, RN(name) on [employer]  being received from GI(unit) for routine post - op Report consisted of patients Situation, Background, Assessment and  
Recommendations(SBAR). Information from the following report(s) Procedure Summary, Recent Results and Quality Measures was reviewed with the receiving nurse. Opportunity for questions and clarification was provided. Assessment completed upon patients arrival to unit and care assumed.

## 2019-04-25 NOTE — ANESTHESIA POSTPROCEDURE EVALUATION
Procedure(s): 
COLONOSCOPY. 
 
total IV anesthesia Anesthesia Post Evaluation Multimodal analgesia: multimodal analgesia used between 6 hours prior to anesthesia start to PACU discharge Patient location during evaluation: bedside Patient participation: complete - patient participated Level of consciousness: awake and responsive to light touch Pain management: adequate Airway patency: patent Anesthetic complications: no 
Cardiovascular status: acceptable, hemodynamically stable, blood pressure returned to baseline and stable Respiratory status: acceptable, unassisted, spontaneous ventilation and nonlabored ventilation Hydration status: acceptable Post anesthesia nausea and vomiting:  controlled Vitals Value Taken Time /76 4/25/2019 12:16 PM  
Temp 36.6 °C (97.8 °F) 4/25/2019 12:16 PM  
Pulse 62 4/25/2019 12:16 PM  
Resp 18 4/25/2019 12:16 PM  
SpO2 99 % 4/25/2019 12:16 PM

## 2019-04-25 NOTE — PROGRESS NOTES
Problem: Falls - Risk of 
Goal: *Absence of Falls Description Document Kirk Enamorado Fall Risk and appropriate interventions in the flowsheet.  
Outcome: Progressing Towards Goal

## 2019-04-25 NOTE — PROGRESS NOTES
END OF SHIFT NOTE: 
 
Intake/Output 04/24 1901 - 04/25 0700 In: 908 [I.V.:908] Out: -   
Voiding: YES Catheter: NO 
Drain:   
 
 
 
 
Stool:  8 occurrences. Stool Assessment Stool Color: Brown;Red streaks(per patient) (04/25/19 0245) Stool Appearance: Melena;Soft(per patient) (04/25/19 0245) Stool Amount: Medium (04/25/19 0245) Stool Source/Status: Rectum (04/25/19 0245) Emesis:  0 occurrences. VITAL SIGNS Patient Vitals for the past 12 hrs: 
 Temp Pulse Resp BP SpO2  
04/25/19 0323 97.7 °F (36.5 °C) 69 18 115/71 97 % 04/24/19 2331 97.8 °F (36.6 °C) 72 18 121/64 98 % 04/24/19 1947 97.8 °F (36.6 °C) 74 20 134/72 99 % Pain Assessment Pain 1 Pain Scale 1: Numeric (0 - 10) (04/25/19 0245) Pain Intensity 1: 0 (04/25/19 0245) Patient Stated Pain Goal: 0 (04/25/19 0245) Ambulating Yes Additional Information: Patient finished bowel prep at about 2300. Went to the bathroom several times during the night. Reported small amounts of blood each time he went. Hgb dropped from 11.6 to 10.4. Otherwise no complaints. Remained NPO after midnight and going down for a colonoscopy today. Shift report given to oncoming nurse at the bedside. Curly Hunt

## 2019-04-25 NOTE — PROCEDURES
COLONOSCOPY    DATE of PROCEDURE: 4/25/2019    MEDICATION:  MAC      INSTRUMENT: PCF    PROCEDURE: After obtaining informed consent, the patient was placed in the left lateral position and sedated. The endoscope was advanced to the cecum where the appendiceal orifice and ileocecal valve were identified. Scope was then inserted to the terminal ileum for 10cm. On withdrawal, the colon was carefully visualized in a 360 degree fashion. Retroflexion was performed in the rectum. The patient was taken to the recovery area in stable condition. FINDINGS:  Rectum: normal  Sigmoid: Extensive diverticulosis with some old blood but no active bleeding noted from anywhere or clots on diveritculum to suggest which one bled. Descending Colon: Extensive diverticulosis with some old blood but no active bleeding  Transverse Colon: normal  Ascending Colon: normal  Cecum: normal  Terminal ileum: normal without blood. Estimated blood loss: 0-minimal     ASSESSMENT/PLAN:  1. Advance diet  2.  Can d/c home from GI standpoint      Gibran Cancino MD  [facility], [place]

## 2019-04-25 NOTE — PROGRESS NOTES
Problem: Falls - Risk of 
Goal: *Absence of Falls Description Document Reedy Rafita Fall Risk and appropriate interventions in the flowsheet. Outcome: Progressing Towards Goal 
  
Problem: Patient Education: Go to Patient Education Activity Goal: Patient/Family Education Outcome: Progressing Towards Goal

## 2019-04-25 NOTE — INTERVAL H&P NOTE
H&P Update: 
Theresa Norwood was seen and examined. History and physical has been reviewed. The patient has been examined.  There have been no significant clinical changes since the completion of the originally dated History and Physical.

## 2019-04-25 NOTE — ROUTINE PROCESS
Pt arrived to GI prep bay at this time with transport. Wife in pt room. PIV in place, no gtts running.

## 2019-04-25 NOTE — PROGRESS NOTES
SW reviewed pt's chart. He is here related to a GI Bleed. Pt is AAO and able to make needs known. Demographics verified. No needs at this time. SW will continue to monitor and follow up. Care Management Interventions PCP Verified by CM: Yes Mode of Transport at Discharge: Self Transition of Care Consult (CM Consult): Discharge Planning Discharge Durable Medical Equipment: No 
Physical Therapy Consult: No 
Occupational Therapy Consult: No 
Speech Therapy Consult: No 
Current Support Network: Own Home Confirm Follow Up Transport: Family Freedom of Choice Offered: Yes Discharge Location Discharge Placement: Home

## 2019-04-25 NOTE — PROGRESS NOTES
TRANSFER - IN REPORT: 
 
Verbal report received from Brenda(name) on [employer]  being received from 504(unit) for routine progression of care Report consisted of patients Situation, Background, Assessment and  
Recommendations(SBAR). Information from the following report(s) SBAR and Kardex was reviewed with the receiving nurse. Opportunity for questions and clarification was provided. Assessment completed upon patients arrival to unit and care assumed.

## 2019-04-25 NOTE — ROUTINE PROCESS
TRANSFER - OUT REPORT: 
 
Verbal report given to JUVE Suazo(name) on Mari Sandoval  being transferred to 5th floor(unit) for routine post - op Report consisted of patients Situation, Background, Assessment and  
Recommendations(SBAR). Information from the following report(s) SBAR, Kardex, Procedure Summary, Intake/Output and MAR was reviewed with the receiving nurse. Lines:  
Peripheral IV 04/24/19 Left Antecubital (Active) Site Assessment Clean, dry, & intact 4/25/2019  8:10 AM  
Phlebitis Assessment 0 4/25/2019  8:10 AM  
Infiltration Assessment 0 4/25/2019  8:10 AM  
Dressing Status Clean, dry, & intact 4/25/2019  8:10 AM  
Dressing Type Transparent;Tape 4/25/2019  8:10 AM  
Hub Color/Line Status Flushed;Patent; Infusing 4/25/2019  8:10 AM  
Alcohol Cap Used No 4/25/2019  8:10 AM  
   
Peripheral IV 04/24/19 Right Antecubital (Active) Site Assessment Clean, dry, & intact 4/25/2019 10:36 AM  
Phlebitis Assessment 0 4/25/2019 10:36 AM  
Infiltration Assessment 0 4/25/2019 10:36 AM  
Dressing Status Clean, dry, & intact 4/25/2019 10:36 AM  
Dressing Type Tape;Transparent 4/25/2019 10:36 AM  
Hub Color/Line Status Patent; Flushed 4/25/2019 10:36 AM  
Alcohol Cap Used No 4/25/2019  8:10 AM  
  
 
Opportunity for questions and clarification was provided.

## 2019-04-25 NOTE — PROGRESS NOTES
1017- pt left floor with transport via stretcher for GI procedure. TRANSFER - OUT REPORT: 
 
Verbal report given to JUVE Luke(name) on [employer]  being transferred to [employer]) for ordered procedure Report consisted of patients Situation, Background, Assessment and  
Recommendations(SBAR). Information from the following report(s) SBAR, Intake/Output, Procedure Verification and Quality Measures was reviewed with the receiving nurse. Lines:  
Peripheral IV 04/24/19 Left Antecubital (Active) Site Assessment Clean, dry, & intact 4/25/2019  2:45 AM  
Phlebitis Assessment 0 4/25/2019  2:45 AM  
Infiltration Assessment 0 4/25/2019  2:45 AM  
Dressing Status Clean, dry, & intact 4/25/2019  2:45 AM  
Dressing Type Transparent;Tape 4/25/2019  2:45 AM  
Hub Color/Line Status Patent 4/25/2019  2:45 AM  
Alcohol Cap Used No 4/25/2019  2:45 AM  
   
Peripheral IV 04/24/19 Right Antecubital (Active) Site Assessment Clean, dry, & intact 4/25/2019  2:45 AM  
Phlebitis Assessment 0 4/25/2019  2:45 AM  
Infiltration Assessment 0 4/25/2019  2:45 AM  
Dressing Status Clean, dry, & intact 4/25/2019  2:45 AM  
Dressing Type Transparent;Tape 4/25/2019  2:45 AM  
Hub Color/Line Status Patent 4/25/2019  2:45 AM  
Alcohol Cap Used No 4/25/2019  2:45 AM  
  
 
Opportunity for questions and clarification was provided. Patient transported with: 
 [email or web address]

## 2019-04-25 NOTE — PROGRESS NOTES
END OF SHIFT NOTE: 
 
Intake/Output 04/25 0701 - 04/25 1900 In: 2535 [P.O.:740; I.V.:1795] Out: 300 [Urine:300] Voiding: YES Catheter: NO 
Drain:   
 
 
 
 
Stool:  0 occurrences. Stool Assessment Stool Color: Brown;Red streaks(per patient) (04/25/19 0245) Stool Appearance: Melena;Soft(per patient) (04/25/19 0245) Stool Amount: Medium (04/25/19 0245) Stool Source/Status: Rectum (04/25/19 0245) Emesis:  0 occurrences. VITAL SIGNS Patient Vitals for the past 12 hrs: 
 Temp Pulse Resp BP SpO2  
04/25/19 1216 97.8 °F (36.6 °C) 62 18 124/76 99 % 04/25/19 1205  62 18 134/74 98 % 04/25/19 1154  66 16 131/72 98 % 04/25/19 1145  67 16 126/83 99 % 04/25/19 1134 98.6 °F (37 °C) 72 14 118/70 99 % 04/25/19 1058  66 15 141/75 99 % 04/25/19 1033  64 18 149/80 100 % 04/25/19 0710 98.3 °F (36.8 °C) 65 18 120/69 100 % Pain Assessment Pain 1 Pain Scale 1: Numeric (0 - 10) (04/25/19 1412) Pain Intensity 1: 0 (04/25/19 1412) Patient Stated Pain Goal: 0 (04/25/19 1412) Ambulating Yes Additional Information:  
 
- s/p colonoscopy today -- tolerated procedure well, no pain/nausea -- diverticulosis 
- Dr. Juan Manuel Montez decided to keep pt to do echo given pt's syncopal episode yesterday - EF 55-60%  
- d/c'd IVF 
- eating solid foods and tolerating well - planning for d/c tomorrow AM 
- hgb to be rechecked in AM 
- IV protonix switched to PO 
- UA collected - normal 
- wife at bedside for most of shift today. Pt ambulated well without assist - up ad joni. - pleasant - no further needs expressed. Shift report will be given to oncoming nurse at the bedside.  
 
Gaurav Ramires RN

## 2019-04-25 NOTE — ANESTHESIA PREPROCEDURE EVALUATION
Relevant Problems No relevant active problems Anesthetic History No history of anesthetic complications Review of Systems / Medical History Patient summary reviewed and pertinent labs reviewed Pulmonary Within defined limits Neuro/Psych Within defined limits Cardiovascular Hypertension: well controlled Hyperlipidemia Pertinent negatives: No angina Exercise tolerance: >4 METS 
  
GI/Hepatic/Renal 
  
GERD Endo/Other Diabetes: well controlled Arthritis and anemia Other Findings Physical Exam 
 
Airway Mallampati: II 
TM Distance: 4 - 6 cm Neck ROM: normal range of motion Mouth opening: Normal 
 
 Cardiovascular Rhythm: regular Rate: normal 
 
 
Pertinent negatives: No murmur Dental 
No notable dental hx Pulmonary Breath sounds clear to auscultation Abdominal 
 
 
 
 Other Findings Anesthetic Plan ASA: 3 Anesthesia type: total IV anesthesia Induction: Intravenous Anesthetic plan and risks discussed with: Patient

## 2019-04-25 NOTE — PROGRESS NOTES
65- Dr. Compa Kennedy updated on pt condition/procedure and ok to d/c from GI standpoint. MD stated he will be by to see pt as soon as possible. Diet orders in, pt resting in bed. VSS.

## 2019-04-26 VITALS
HEART RATE: 67 BPM | RESPIRATION RATE: 18 BRPM | WEIGHT: 180 LBS | OXYGEN SATURATION: 96 % | HEIGHT: 68 IN | DIASTOLIC BLOOD PRESSURE: 71 MMHG | BODY MASS INDEX: 27.28 KG/M2 | TEMPERATURE: 98.2 F | SYSTOLIC BLOOD PRESSURE: 118 MMHG

## 2019-04-26 LAB
ALBUMIN SERPL-MCNC: 2.9 G/DL (ref 3.2–4.6)
ALBUMIN/GLOB SERPL: 0.9 {RATIO} (ref 1.2–3.5)
ALP SERPL-CCNC: 103 U/L (ref 50–136)
ALT SERPL-CCNC: 28 U/L (ref 12–65)
ANION GAP SERPL CALC-SCNC: 8 MMOL/L (ref 7–16)
AST SERPL-CCNC: 16 U/L (ref 15–37)
BILIRUB SERPL-MCNC: 0.2 MG/DL (ref 0.2–1.1)
BUN SERPL-MCNC: 17 MG/DL (ref 8–23)
CALCIUM SERPL-MCNC: 8.5 MG/DL (ref 8.3–10.4)
CHLORIDE SERPL-SCNC: 109 MMOL/L (ref 98–107)
CO2 SERPL-SCNC: 24 MMOL/L (ref 21–32)
CREAT SERPL-MCNC: 1.03 MG/DL (ref 0.8–1.5)
ERYTHROCYTE [DISTWIDTH] IN BLOOD BY AUTOMATED COUNT: 12.9 % (ref 11.9–14.6)
GLOBULIN SER CALC-MCNC: 3.2 G/DL (ref 2.3–3.5)
GLUCOSE SERPL-MCNC: 184 MG/DL (ref 65–100)
HCT VFR BLD AUTO: 30.4 % (ref 41.1–50.3)
HGB BLD-MCNC: 9.9 G/DL (ref 13.6–17.2)
MCH RBC QN AUTO: 28.9 PG (ref 26.1–32.9)
MCHC RBC AUTO-ENTMCNC: 32.6 G/DL (ref 31.4–35)
MCV RBC AUTO: 88.9 FL (ref 79.6–97.8)
NRBC # BLD: 0 K/UL (ref 0–0.2)
PLATELET # BLD AUTO: 103 K/UL (ref 150–450)
PMV BLD AUTO: 11.8 FL (ref 9.4–12.3)
POTASSIUM SERPL-SCNC: 4.2 MMOL/L (ref 3.5–5.1)
PROT SERPL-MCNC: 6.1 G/DL (ref 6.3–8.2)
RBC # BLD AUTO: 3.42 M/UL (ref 4.23–5.6)
SODIUM SERPL-SCNC: 141 MMOL/L (ref 136–145)
WBC # BLD AUTO: 5.7 K/UL (ref 4.3–11.1)

## 2019-04-26 PROCEDURE — 85027 COMPLETE CBC AUTOMATED: CPT

## 2019-04-26 PROCEDURE — 80053 COMPREHEN METABOLIC PANEL: CPT

## 2019-04-26 PROCEDURE — 36415 COLL VENOUS BLD VENIPUNCTURE: CPT

## 2019-04-26 PROCEDURE — 74011250637 HC RX REV CODE- 250/637: Performed by: INTERNAL MEDICINE

## 2019-04-26 RX ORDER — PRAVASTATIN SODIUM 40 MG/1
40 TABLET ORAL
Qty: 30 TAB | Refills: 0 | Status: SHIPPED
Start: 2019-04-26

## 2019-04-26 RX ORDER — PANTOPRAZOLE SODIUM 40 MG/1
40 TABLET, DELAYED RELEASE ORAL DAILY
Qty: 30 TAB | Refills: 0 | Status: SHIPPED | OUTPATIENT
Start: 2019-04-26 | End: 2019-04-26

## 2019-04-26 RX ORDER — METFORMIN HYDROCHLORIDE 500 MG/1
500 TABLET ORAL
Qty: 30 TAB | Refills: 0 | Status: SHIPPED
Start: 2019-04-26

## 2019-04-26 RX ORDER — LATANOPROST 50 UG/ML
1 SOLUTION/ DROPS OPHTHALMIC
Qty: 1 BOTTLE | Refills: 0 | Status: SHIPPED | OUTPATIENT
Start: 2019-04-26

## 2019-04-26 RX ORDER — CETIRIZINE HCL 10 MG
10 TABLET ORAL DAILY
Qty: 30 TAB | Refills: 0 | Status: SHIPPED
Start: 2019-04-26

## 2019-04-26 RX ORDER — ALLOPURINOL 100 MG/1
TABLET ORAL
Qty: 90 TAB | Refills: 0 | Status: SHIPPED
Start: 2019-04-26 | End: 2019-04-26

## 2019-04-26 RX ORDER — OMEPRAZOLE 20 MG/1
20 CAPSULE, DELAYED RELEASE ORAL DAILY
Qty: 30 CAP | Refills: 1 | Status: SHIPPED | OUTPATIENT
Start: 2019-04-26

## 2019-04-26 RX ADMIN — PANTOPRAZOLE SODIUM 40 MG: 40 TABLET, DELAYED RELEASE ORAL at 08:04

## 2019-04-26 RX ADMIN — Medication 10 ML: at 05:31

## 2019-04-26 NOTE — PROGRESS NOTES
Progress Note Patient: Rasta Mabry MRN: [id number]  SSN: [id number] YOB: 1950  Age: 78 y.o. Sex: male Admit Date: 4/24/2019 LOS: 1 day Subjective: This is a 79 YO male patient admitted yesterday due to lower GI bleed andsyncopal episode. Had Colonoscopy today. Diverticulosis reported. ECHO ordered. Will start GI soft diet and will follow clinical progress. Objective:  
 
Vitals:  
 04/25/19 1154 04/25/19 1205 04/25/19 1216 04/25/19 1905 BP: 131/72 134/74 124/76 132/65 Pulse: 66 62 62 77 Resp: 16 18 18 18 Temp:   97.8 °F (36.6 °C) 98.2 °F (36.8 °C) SpO2: 98% 98% 99% 94% Weight:      
Height:      
  
 
Intake and Output: 
Current Shift: No intake/output data recorded. Last three shifts: 04/24 0701 - 04/25 1900 In: 4886 [P.O.:740; I.V.:2703] Out: 300 [Urine:300] Physical Exam:  
GENERAL: alert, no distress, appears stated age EYE: conjunctivae/corneas clear. LYMPHATIC: Cervical, supraclavicular, and axillary nodes normal.  
THROAT & NECK: normal and no erythema or exudates noted. LUNG: clear to auscultation bilaterally HEART: RRR 
ABDOMEN: soft, non-tender. Bowel sounds normal. No masses,  no organomegaly EXTREMITIES:  extremities normal, atraumatic, no cyanosis or edema SKIN: Normal. 
 
Lab/Data Review: All lab results for the last 24 hours reviewed. Assessment:  
 
Principal Problem: 
  GI bleed (4/24/2019) Active Problems: 
  Syncope (4/24/2019) Hyperlipidemia (4/24/2019) Diabetes mellitus type 2, controlled ([address] (4/24/2019) SCOTT (acute kidney injury) ([address] (4/24/2019) Anemia (4/24/2019) Hypokalemia (4/24/2019) Plan:  
 
-stop IV fluids 
-oral PPIs -ECHO ordered -GI soft diet. If stable will d/c him tomorrow Signed By: Almita Quiles MD   
 April 25, 2019

## 2019-04-26 NOTE — PROGRESS NOTES
Bedside shift change report given to Narendra Castillo RN (oncoming nurse) by Trish Savage RN (offgoing nurse). Report included the following information SBAR, Kardex, Intake/Output, MAR, Accordion and Recent Results.

## 2019-04-26 NOTE — PROGRESS NOTES
Verbal report received from [facility]. Pt alert and oriented x4. No respiratory distress. No c/o of pain. VSS. Ready to be d/c. Ready to eat breakfast. No needs voiced at this time.

## 2019-04-26 NOTE — DISCHARGE INSTRUCTIONS
Patient Education        Diverticulitis: Care Instructions  Your Care Instructions    Diverticulitis occurs when pouches form in the wall of the colon and become inflamed or infected. It can be very painful. Doctors aren't sure what causes diverticulitis. There is no proof that foods such as nuts, seeds, or berries cause it or make it worse. A low-fiber diet may cause the colon to work harder to push stool forward. Pouches may form because of this extra work. It may be hard to think about healthy eating while you're in pain. But as you recover, you might think about how you can use healthy eating for overall better health. Healthy eating may help you avoid future attacks. Follow-up care is a key part of your treatment and safety. Be sure to make and go to all appointments, and call your doctor if you are having problems. It's also a good idea to know your test results and keep a list of the medicines you take. How can you care for yourself at home? · Drink plenty of fluids, enough so that your urine is light yellow or clear like water. If you have kidney, heart, or liver disease and have to limit fluids, talk with your doctor before you increase the amount of fluids you drink. · Stick to liquids or a bland diet (plain rice, bananas, dry toast or crackers, applesauce) until you are feeling better. Then you can return to regular foods and gradually increase the amount of fiber in your diet. · Use a heating pad set on low on your belly to relieve mild cramps and pain. · Get extra rest until you are feeling better. · Be safe with medicines. Read and follow all instructions on the label. ? If the doctor gave you a prescription medicine for pain, take it as prescribed. ? If you are not taking a prescription pain medicine, ask your doctor if you can take an over-the-counter medicine. · If your doctor prescribed antibiotics, take them as directed. Do not stop taking them just because you feel better.  You need to take the full course of antibiotics. To prevent future attacks of diverticulitis  · Avoid constipation:  ? Include fruits, vegetables, beans, and whole grains in your diet each day. These foods are high in fiber. ? Drink plenty of fluids, enough so that your urine is light yellow or clear like water. If you have kidney, heart, or liver disease and have to limit fluids, talk with your doctor before you increase the amount of fluids you drink. ? Get some exercise every day. Build up slowly to 30 to 60 minutes a day on 5 or more days of the week. ? Take a fiber supplement, such as Citrucel or Metamucil, every day if needed. Read and follow all instructions on the label. ? Schedule time each day for a bowel movement. Having a daily routine may help. Take your time and do not strain when having a bowel movement. When should you call for help? Call your doctor now or seek immediate medical care if:    · You have a fever.     · You are vomiting.     · You have new or worse belly pain.     · You cannot pass stools or gas.    Watch closely for changes in your health, and be sure to contact your doctor if you have any problems. Where can you learn more? Go to http://waqar-kaylah.info/. Enter H901 in the search box to learn more about \"Diverticulitis: Care Instructions. \"  Current as of: March 27, 2018  Content Version: 11.9  © 1467-8648 [employer]. Care instructions adapted under license by [employer] (which disclaims liability or warranty for this information). If you have questions about a medical condition or this instruction, always ask your healthcare professional. [place] [address] any warranty or liability for your use of this information.          DISCHARGE SUMMARY from Nurse    PATIENT INSTRUCTIONS:    After general anesthesia or intravenous sedation, for 24 hours or while taking prescription Narcotics:  · Limit your activities  · Do not drive and operate hazardous machinery  · Do not make important personal or business decisions  · Do  not drink alcoholic beverages  · If you have not urinated within 8 hours after discharge, please contact your surgeon on call. Report the following to your surgeon:  · Excessive pain, swelling, redness or odor of or around the surgical area  · Temperature over 100.5  · Nausea and vomiting lasting longer than 4 hours or if unable to take medications  · Any signs of decreased circulation or nerve impairment to extremity: change in color, persistent  numbness, tingling, coldness or increase pain  · Any questions    What to do at Home:  Recommended activity: Activity as tolerated. If you experience any of the following symptoms:  Fever greater than 100.5,  Continued or increased bloody/ black stools,  please follow up with your doctor. *  Please give a list of your current medications to your Primary Care Provider. *  Please update this list whenever your medications are discontinued, doses are      changed, or new medications (including over-the-counter products) are added. *  Please carry medication information at all times in case of emergency situations. These are general instructions for a healthy lifestyle:    No smoking/ No tobacco products/ Avoid exposure to second hand smoke  Surgeon General's Warning:  Quitting smoking now greatly reduces serious risk to your health. Obesity, smoking, and sedentary lifestyle greatly increases your risk for illness    A healthy diet, regular physical exercise & weight monitoring are important for maintaining a healthy lifestyle    You may be retaining fluid if you have a history of heart failure or if you experience any of the following symptoms:  Weight gain of 3 pounds or more overnight or 5 pounds in a week, increased swelling in our hands or feet or shortness of breath while lying flat in bed.   Please call your doctor as soon as you notice any of these symptoms; do not wait until your next office visit. Recognize signs and symptoms of STROKE:    F-face looks uneven    A-arms unable to move or move unevenly    S-speech slurred or non-existent    T-time-call 911 as soon as signs and symptoms begin-DO NOT go       Back to bed or wait to see if you get better-TIME IS BRAIN. Warning Signs of HEART ATTACK     Call 911 if you have these symptoms:   Chest discomfort. Most heart attacks involve discomfort in the center of the chest that lasts more than a few minutes, or that goes away and comes back. It can feel like uncomfortable pressure, squeezing, fullness, or pain.  Discomfort in other areas of the upper body. Symptoms can include pain or discomfort in one or both arms, the back, neck, jaw, or stomach.  Shortness of breath with or without chest discomfort.  Other signs may include breaking out in a cold sweat, nausea, or lightheadedness. Don't wait more than five minutes to call 911 - MINUTES MATTER! Fast action can save your life. Calling 911 is almost always the fastest way to get lifesaving treatment. Emergency Medical Services staff can begin treatment when they arrive -- up to an hour sooner than if someone gets to the hospital by car. The discharge information has been reviewed with the patient. The patient verbalized understanding. Discharge medications reviewed with the patient and appropriate educational materials and side effects teaching were provided.   ___________________________________________________________________________________________________________________________________

## 2019-04-26 NOTE — PROGRESS NOTES
Pt is being discharged no needs from Case management at time of discharge. Pt is being discharged home. Care Management Interventions PCP Verified by CM: Yes Mode of Transport at Discharge: Self Transition of Care Consult (CM Consult): Discharge Planning Discharge Durable Medical Equipment: No 
Physical Therapy Consult: No 
Occupational Therapy Consult: No 
Speech Therapy Consult: No 
Current Support Network: Own Home, Family Lives [place] Confirm Follow Up Transport: Family Plan discussed with Pt/Family/Caregiver: Yes Freedom of Choice Offered: Yes [employer] Information Provided?: No 
Discharge Location Discharge Placement: Home

## 2019-04-27 NOTE — DISCHARGE SUMMARY
Discharge Summary     Patient: Rasta Mabry MRN: [id number]  SSN: [id number]    YOB: 1950  Age: 78 y.o. Sex: male       Admit Date: 4/24/2019    Discharge Date: 4/26/2019     Admission Diagnoses: GI bleed [K92.2]    Discharge Diagnoses:   Problem List as of 4/26/2019 Never Reviewed          Codes Class Noted - Resolved    * (Principal) GI bleed ICD-10-CM: K92.2  ICD-9-CM: 578.9  4/24/2019 - Present        Syncope ICD-10-CM: R55  ICD-9-CM: 780.2  4/24/2019 - Present        Hyperlipidemia (Chronic) ICD-10-CM: E78.5  ICD-9-CM: 272.4  4/24/2019 - Present        Diabetes mellitus type 2, [facility] ([facility] (Chronic) ICD-10-CM: E11.9  ICD-9-CM: 250.00  4/24/2019 - Present        SCOTT (acute kidney injury) ([facility] ICD-10-CM: N17.9  ICD-9-CM: 584.9  4/24/2019 - Present        Anemia ICD-10-CM: D64.9  ICD-9-CM: 285.9  4/24/2019 - Present        Hypokalemia ICD-10-CM: E87.6  ICD-9-CM: 276.8  4/24/2019 - Present               Discharge Condition: Stable    Hospital Course: This is a 79 yo male with PMH of controlled DM2, HLP, intentional weight loss done by intermittent fasting who  Came to the hospital on 4/24 with several bloody BM occurring the day of admission. Denied nausea , abd pain or fever. He takes asa 81 mg daily, no ETOH use. He was noted to have episode of syncope while in the bathroom,  per wife- not preceded by dizziness or chest pain. His initial Hgb was 11.6 and it dropped to a value around 10 g/dL ( 10.4 -10.8 - 9.9). His regular dose of Lisinopril and ASA were held. He remained hemodynamically stable. He was seen by GI and had a colonoscopy done on 4/25 which reported: \"Rectum: normal  Sigmoid: Extensive diverticulosis with some old blood but no active bleeding noted from anywhere or clots on diveritculum to suggest which one bled.   Descending Colon: Extensive diverticulosis with some old blood but no active bleeding  Transverse Colon: normal  Ascending Colon: normal  Cecum: normal  Terminal ileum: normal without blood. Estimated blood loss: 0-minimal \"    It was felt his bleeding was secondary to diverticulosis. His VS remained stable and he had no more episodes of BRBPR. An ECHO showed normal LVEF. His aortic root had moderate dilation, but his chest X ray did not show evidence of aneurysm. He could have an ECHO done yearly for surveillance as an outpatient. His telemetry never reported Afib or any other arrhythmia and it was felt his syncope was most likely vasovagal in nature due to his rectal bleeding. He was told to continue holding his lisinopril at discharge as his BP was borderline low. He will follow up with his PCP with labs in order to decide if and when he has to resume his lisinopril and ASA. He will follow up with GI too. PE:    GENERAL: alert, no distress, appears stated age  EYE: conjunctivae/corneas clear. LYMPHATIC: Cervical, supraclavicular, and axillary nodes normal.   THROAT & NECK: normal and no erythema or exudates noted. LUNG: clear to auscultation bilaterally  HEART: RRR  ABDOMEN: soft, non-tender. Bowel sounds normal. No masses,  no organomegaly  EXTREMITIES:  extremities normal, atraumatic, no cyanosis or edema  SKIN: Normal.      Consults: Gastroenterology    Significant Diagnostic Studies: see hospital course     Disposition: home    Discharge Medications:   Discharge Medication List as of 4/26/2019 10:09 AM      START taking these medications    Details   latanoprost (XALATAN) 0.005 % ophthalmic solution Administer 1 Drop to both eyes nightly. , Print, Disp-1 Bottle, R-0      cetirizine (ZYRTEC) 10 mg tablet Take 1 Tab by mouth daily. , No Print, Disp-30 Tab, R-0      omeprazole (PRILOSEC) 20 mg capsule Take 1 Cap by mouth daily. , Print, Disp-30 Cap, R-1         CONTINUE these medications which have CHANGED    Details   metFORMIN (GLUCOPHAGE) 500 mg tablet Take 1 Tab by mouth daily (with breakfast). , No Print, Disp-30 Tab, R-0      pravastatin (PRAVACHOL) 40 mg tablet Take 1 Tab by mouth nightly., No Print, Disp-30 Tab, R-0         CONTINUE these medications which have NOT CHANGED    Details   levothyroxine (SYNTHROID) 150 mcg tablet Take 150 mcg by mouth Daily (before breakfast). , Historical Med         STOP taking these medications       pantoprazole (PROTONIX) 40 mg tablet Comments:   Reason for Stopping:         lisinopril (PRINIVIL, ZESTRIL) 10 mg tablet Comments:   Reason for Stopping:         aspirin delayed-release 81 mg tablet Comments:   Reason for Stopping:                 Activity: Activity as tolerated  Diet: Low fat, Low cholesterol  Wound Care: None needed    Follow-up Appointments   Procedures    FOLLOW UP VISIT Appointment in: 3 - 5 Days pcp     pcp     Standing Status:   Standing     Number of Occurrences:   1     Order Specific Question:   Appointment in     Answer:   3 - 5 Days    FOLLOW UP VISIT Appointment in: Ten Days GI     GI     Standing Status:   Standing     Number of Occurrences:   1     Standing Expiration Date:   4/27/2019     Order Specific Question:   Appointment in     Answer:   Ten Days     Order Specific Question:   Appt Date:      Answer:   5/3/2019     Order Specific Question:   Appt Time:     Answer:   11:30 AM     Order Specific Question:   Office Contact:     Answer:   yes     Order Specific Question:   What provider will pt be seeing:     Answer:   [place]       Signed By: Rupa Meza MD     April 27, 2019

## 2019-04-29 ENCOUNTER — PATIENT OUTREACH (OUTPATIENT)
Dept: CASE MANAGEMENT | Age: 69
End: 2019-04-29

## 2019-04-29 NOTE — PROGRESS NOTES
This note will not be viewable in [address] Transition of Care Discharge Follow-up Questionnaire Date/Time of Call: 
 4/29/19 
 1255pm  
What was the patient hospitalized for? GI bleed Does the patient understand his/her diagnosis and/or treatment and what happened during the hospitalization? Yes, spoke with patient, he states understanding of diagnosis and treatment; and is agreeable to call. Patient states he is doing well, a little weak still but resting and getting plenty of fluids Did the patient receive discharge instructions? Yes   
CM Assessed Risk for Readmission:  
 
 
Patient stated Risk for Readmission:  
 
 low r/t diagnosis None stated Review any discharge instructions (see discharge instructions/AVS in [facility]). Ask patient if they understand these. Do they have any questions? Reviewed, understanding is stated, no questions at this time Were home services ordered (nursing, PT, OT, ST, etc.)? No   
If so, has the first visit occurred? If not, why? (Assist with coordination of services if necessary.) 
 N/A Was any DME ordered? No 
  
If so, has it been received? If not, why?  (Assist patient in obtaining DME orders &/or equipment if necessary.) N/A Complete a review of all medications (new, continued and discontinued meds per the D/C instructions and medication tab in [facility]). Complete START taking: 
cetirizine 10 mg tablet (ZYRTEC) 
latanoprost 0.005 % ophthalmic solution (XALATAN) 
omeprazole 20 mg capsule (PRILOSEC) CHANGE how you take: 
metFORMIN 500 mg tablet (GLUCOPHAGE) pravastatin 40 mg tablet (PRAVACHOL) STOP taking: 
aspirin delayed-release 81 mg tablet 
lisinopril 10 mg tablet (Nonah Malissa) Were all new prescriptions filled? If not, why?  (Assist patient in obtaining medications if necessary  escalate for CCM &/or SW if ongoing issues are verbalized by pt or anticipated) Patient states he will  after reviewing  all medications and changes with his PCP Does the patient understand the purpose and dosing instructions for all medications? (If patient has questions, provide explanation and education.) Yes Does the patient have any problems in performing ADLs? (If patient is unable to perform ADLs  what is the limiting factor(s)? Do they have a support system that can assist? If no support system is present, discuss possible assistance that they may be able to obtain. Escalate for CCM/SW if ongoing issues are verbalized by pt or anticipated) Independent with ADLs, states he will be volunteering tomorrow as it is not strenuous activity but will hold off on resuming running his normal 1 mile daily until follow up with PCP. Does the patient have all follow-up appointments scheduled? 7 day f/up with PCP?  
(f/up with PCP may be w/in 14 days if patient has a f/up with their specialist w/in 7 days) 7-14 day f/up with specialist?  
(or per discharge instructions) If f/up has not been made  what actions has the care coordinator made to accomplish this? Has transportation been arranged? Yes Dr. Karie Jackosn 5/3/19 Yes, no transportation needs identified at this time. Any other questions or concerns expressed by the patient? No other needs or concerns identified. Patient states his gratitude for follow up. Contact information for [facility] was given, instructed to call with new questions or concerns. Schedule next appointment with SINDHU LEAHY Coordinator or refer to RN Case Manager/ per the workflow guidelines. When is care coordinators next follow-up call scheduled? If referred for CCM  what RN care manager was the referral assigned? Patient stated no follow up was needed. BIAN[facility] Call Completed By: Pete Jacques LPN Community Care Coordinator

## 2020-12-03 DIAGNOSIS — K11.5 SIALOLITHIASIS: Primary | ICD-10-CM

## 2022-03-18 PROBLEM — N17.9 AKI (ACUTE KIDNEY INJURY): Status: ACTIVE | Noted: 2019-04-24

## 2022-03-19 PROBLEM — R55 SYNCOPE: Status: ACTIVE | Noted: 2019-04-24

## 2022-03-19 PROBLEM — E11.9 DIABETES MELLITUS TYPE 2, CONTROLLED (HCC): Status: ACTIVE | Noted: 2019-04-24

## 2022-03-19 PROBLEM — K92.2 GI BLEED: Status: ACTIVE | Noted: 2019-04-24

## 2022-03-19 PROBLEM — D64.9 ANEMIA: Status: ACTIVE | Noted: 2019-04-24

## 2022-03-19 PROBLEM — E78.5 HYPERLIPIDEMIA: Status: ACTIVE | Noted: 2019-04-24

## 2022-03-19 PROBLEM — E87.6 HYPOKALEMIA: Status: ACTIVE | Noted: 2019-04-24

## 2025-08-26 ENCOUNTER — OFFICE VISIT (OUTPATIENT)
Dept: ENT CLINIC | Age: 75
End: 2025-08-26
Payer: MEDICARE

## 2025-08-26 ENCOUNTER — OFFICE VISIT (OUTPATIENT)
Dept: AUDIOLOGY | Age: 75
End: 2025-08-26
Payer: MEDICARE

## 2025-08-26 VITALS
SYSTOLIC BLOOD PRESSURE: 138 MMHG | WEIGHT: 184 LBS | DIASTOLIC BLOOD PRESSURE: 82 MMHG | BODY MASS INDEX: 27.89 KG/M2 | HEIGHT: 68 IN

## 2025-08-26 DIAGNOSIS — H69.93 ETD (EUSTACHIAN TUBE DYSFUNCTION), BILATERAL: Primary | ICD-10-CM

## 2025-08-26 DIAGNOSIS — H65.91 OME (OTITIS MEDIA WITH EFFUSION), RIGHT: ICD-10-CM

## 2025-08-26 DIAGNOSIS — H90.6 MIXED CONDUCTIVE AND SENSORINEURAL HEARING LOSS, BILATERAL: Primary | ICD-10-CM

## 2025-08-26 DIAGNOSIS — H90.A31 MIXED CONDUCTIVE AND SENSORINEURAL HEARING LOSS OF RIGHT EAR WITH RESTRICTED HEARING OF LEFT EAR: ICD-10-CM

## 2025-08-26 PROCEDURE — 92557 COMPREHENSIVE HEARING TEST: CPT | Performed by: AUDIOLOGIST

## 2025-08-26 PROCEDURE — 1159F MED LIST DOCD IN RCRD: CPT | Performed by: STUDENT IN AN ORGANIZED HEALTH CARE EDUCATION/TRAINING PROGRAM

## 2025-08-26 PROCEDURE — 1123F ACP DISCUSS/DSCN MKR DOCD: CPT | Performed by: STUDENT IN AN ORGANIZED HEALTH CARE EDUCATION/TRAINING PROGRAM

## 2025-08-26 PROCEDURE — 92567 TYMPANOMETRY: CPT | Performed by: AUDIOLOGIST

## 2025-08-26 PROCEDURE — G8427 DOCREV CUR MEDS BY ELIG CLIN: HCPCS | Performed by: STUDENT IN AN ORGANIZED HEALTH CARE EDUCATION/TRAINING PROGRAM

## 2025-08-26 PROCEDURE — G8419 CALC BMI OUT NRM PARAM NOF/U: HCPCS | Performed by: STUDENT IN AN ORGANIZED HEALTH CARE EDUCATION/TRAINING PROGRAM

## 2025-08-26 PROCEDURE — 69433 CREATE EARDRUM OPENING: CPT | Performed by: STUDENT IN AN ORGANIZED HEALTH CARE EDUCATION/TRAINING PROGRAM

## 2025-08-26 PROCEDURE — 99204 OFFICE O/P NEW MOD 45 MIN: CPT | Performed by: STUDENT IN AN ORGANIZED HEALTH CARE EDUCATION/TRAINING PROGRAM

## 2025-08-26 PROCEDURE — 4004F PT TOBACCO SCREEN RCVD TLK: CPT | Performed by: STUDENT IN AN ORGANIZED HEALTH CARE EDUCATION/TRAINING PROGRAM

## 2025-08-26 RX ORDER — OFLOXACIN 3 MG/ML
5 SOLUTION AURICULAR (OTIC) 2 TIMES DAILY
Qty: 10 ML | Refills: 0 | Status: SHIPPED | OUTPATIENT
Start: 2025-08-26 | End: 2025-08-29

## 2025-08-26 ASSESSMENT — ENCOUNTER SYMPTOMS
CONSTIPATION: 0
ABDOMINAL PAIN: 0
COUGH: 0
CHOKING: 0
SINUS PAIN: 0
EYE PAIN: 0
EYE DISCHARGE: 0
ABDOMINAL DISTENTION: 0
EYE REDNESS: 0
STRIDOR: 0
COLOR CHANGE: 0

## (undated) DEVICE — SYR 5ML 1/5 GRAD LL NSAF LF --

## (undated) DEVICE — KENDALL RADIOLUCENT FOAM MONITORING ELECTRODE RECTANGULAR SHAPE: Brand: KENDALL

## (undated) DEVICE — NDL PRT INJ NSAF BLNT 18GX1.5 --

## (undated) DEVICE — SYR 3ML LL TIP 1/10ML GRAD --

## (undated) DEVICE — CANNULA NSL ORAL AD FOR CAPNOFLEX CO2 O2 AIRLFE

## (undated) DEVICE — CONNECTOR TBNG OD5-7MM O2 END DISP